# Patient Record
Sex: MALE | Race: WHITE | URBAN - METROPOLITAN AREA
[De-identification: names, ages, dates, MRNs, and addresses within clinical notes are randomized per-mention and may not be internally consistent; named-entity substitution may affect disease eponyms.]

---

## 2021-07-28 ENCOUNTER — OFFICE VISIT (OUTPATIENT)
Dept: URBAN - METROPOLITAN AREA CLINIC 88 | Facility: CLINIC | Age: 46
End: 2021-07-28
Payer: MEDICAID

## 2021-07-28 DIAGNOSIS — E11.3393 TYPE 2 DIAB WITH MOD NONP RTNOP WITHOUT MACULAR EDEMA, BI: Primary | ICD-10-CM

## 2021-07-28 DIAGNOSIS — H35.033 HYPERTENSIVE RETINOPATHY, BILATERAL: ICD-10-CM

## 2021-07-28 PROCEDURE — 92250 FUNDUS PHOTOGRAPHY W/I&R: CPT | Performed by: OPHTHALMOLOGY

## 2021-07-28 PROCEDURE — 92004 COMPRE OPH EXAM NEW PT 1/>: CPT | Performed by: OPHTHALMOLOGY

## 2021-07-28 ASSESSMENT — INTRAOCULAR PRESSURE
OS: 14
OD: 14

## 2021-07-28 ASSESSMENT — VISUAL ACUITY
OS: 20/20
OD: 20/20

## 2021-07-28 ASSESSMENT — KERATOMETRY
OS: 43.00
OD: 43.13

## 2021-07-28 NOTE — IMPRESSION/PLAN
Impression: Unspecified pterygium of left eye: H11.002. Plan: Discussed diagnosis in detail with patient. No treatment is required at this time. UV protection recommended.

## 2021-07-28 NOTE — IMPRESSION/PLAN
Impression: Type 2 diab with mod nonp rtnop without macular edema, bi: [id number]. Plan: Discussed diagnosis in detail with patient. Discussed ocular and systemic benefits of blood sugar control. Baseline Photos OU today: NPDR. Keep future appts. with PCP. No treatment necessary at this time. Patient was instructed to monitor vision for sudden changes and to call if visual changes noted.

## 2022-03-01 ENCOUNTER — OFFICE VISIT (OUTPATIENT)
Dept: URBAN - METROPOLITAN AREA CLINIC 88 | Facility: CLINIC | Age: 47
End: 2022-03-01
Payer: MEDICAID

## 2022-03-01 DIAGNOSIS — H11.151 PINGUECULA, RIGHT EYE: ICD-10-CM

## 2022-03-01 DIAGNOSIS — H11.002 UNSPECIFIED PTERYGIUM OF LEFT EYE: ICD-10-CM

## 2022-03-01 PROCEDURE — 92014 COMPRE OPH EXAM EST PT 1/>: CPT | Performed by: OPHTHALMOLOGY

## 2022-03-01 ASSESSMENT — INTRAOCULAR PRESSURE
OS: 15
OD: 15

## 2022-03-01 NOTE — IMPRESSION/PLAN
Impression: Type 2 diab with mod nonp rtnop without macular edema, bi: [id number]. Plan: Discussed diagnosis in detail with patient. Discussed ocular and systemic benefits of blood sugar control. Keep future appts. with PCP. No treatment necessary at this time. Patient was instructed to monitor vision for sudden changes and to call if visual changes noted.

## 2023-02-03 ENCOUNTER — OFFICE VISIT (OUTPATIENT)
Dept: URBAN - METROPOLITAN AREA CLINIC 88 | Facility: CLINIC | Age: 48
End: 2023-02-03
Payer: MEDICAID

## 2023-02-03 DIAGNOSIS — E11.3393 TYPE 2 DIAB WITH MOD NONP RTNOP WITHOUT MACULAR EDEMA, BI: Primary | ICD-10-CM

## 2023-02-03 DIAGNOSIS — H35.033 HYPERTENSIVE RETINOPATHY, BILATERAL: ICD-10-CM

## 2023-02-03 DIAGNOSIS — H04.123 DRY EYE SYNDROME OF BILATERAL LACRIMAL GLANDS: ICD-10-CM

## 2023-02-03 PROCEDURE — 92014 COMPRE OPH EXAM EST PT 1/>: CPT | Performed by: OPHTHALMOLOGY

## 2023-02-03 ASSESSMENT — INTRAOCULAR PRESSURE
OS: 16
OD: 17

## 2023-02-03 NOTE — IMPRESSION/PLAN
Impression: Type 2 diab with mod nonp rtnop without macular edema, bi: [id number]. Plan: Discussed diagnosis in detail with patient. Discussed ocular and systemic benefits of blood sugar control. Keep future appts. with PCP. Recommend a low dose Ace Inhibitor. No treatment necessary at this time. Patient was instructed to monitor vision for sudden changes and to call if visual changes noted.